# Patient Record
Sex: FEMALE | URBAN - METROPOLITAN AREA
[De-identification: names, ages, dates, MRNs, and addresses within clinical notes are randomized per-mention and may not be internally consistent; named-entity substitution may affect disease eponyms.]

---

## 2021-02-22 ENCOUNTER — HOSPITAL ENCOUNTER (EMERGENCY)
Facility: HOSPITAL | Age: 31
Discharge: LEFT BEFORE DISCHARGE | End: 2021-02-22

## 2021-02-22 ENCOUNTER — APPOINTMENT (OUTPATIENT)
Dept: GENERAL RADIOLOGY | Facility: HOSPITAL | Age: 31
End: 2021-02-22
Attending: NURSE PRACTITIONER
Payer: COMMERCIAL

## 2021-02-22 ENCOUNTER — HOSPITAL ENCOUNTER (EMERGENCY)
Facility: HOSPITAL | Age: 31
Discharge: HOME | End: 2021-02-22
Payer: COMMERCIAL

## 2021-02-22 VITALS
BODY MASS INDEX: 38.57 KG/M2 | DIASTOLIC BLOOD PRESSURE: 75 MMHG | HEIGHT: 66 IN | HEART RATE: 82 BPM | OXYGEN SATURATION: 97 % | WEIGHT: 240 LBS | SYSTOLIC BLOOD PRESSURE: 135 MMHG | TEMPERATURE: 99 F | RESPIRATION RATE: 16 BRPM

## 2021-02-22 DIAGNOSIS — R00.2 PALPITATIONS: Primary | ICD-10-CM

## 2021-02-22 DIAGNOSIS — R07.89 CHEST PAIN, ATYPICAL: ICD-10-CM

## 2021-02-22 LAB
ANION GAP SERPL CALC-SCNC: 4 MMOL/L (ref 0–18)
B-HCG UR QL: NEGATIVE
BACTERIA UR QL AUTO: NEGATIVE /HPF
BASOPHILS # BLD AUTO: 0.02 X10(3) UL (ref 0–0.2)
BASOPHILS NFR BLD AUTO: 0.2 %
BILIRUB UR QL: NEGATIVE
BUN BLD-MCNC: 9 MG/DL (ref 7–18)
BUN/CREAT SERPL: 9.7 (ref 10–20)
CALCIUM BLD-MCNC: 9.2 MG/DL (ref 8.5–10.1)
CHLORIDE SERPL-SCNC: 108 MMOL/L (ref 98–112)
CLARITY UR: CLEAR
CO2 SERPL-SCNC: 27 MMOL/L (ref 21–32)
COLOR UR: YELLOW
CREAT BLD-MCNC: 0.93 MG/DL
D DIMER PPP FEU-MCNC: 0.32 UG/ML FEU (ref ?–0.5)
DEPRECATED RDW RBC AUTO: 44.5 FL (ref 35.1–46.3)
EOSINOPHIL # BLD AUTO: 0.08 X10(3) UL (ref 0–0.7)
EOSINOPHIL NFR BLD AUTO: 0.8 %
ERYTHROCYTE [DISTWIDTH] IN BLOOD BY AUTOMATED COUNT: 14.6 % (ref 11–15)
GLUCOSE BLD-MCNC: 96 MG/DL (ref 70–99)
GLUCOSE UR-MCNC: NEGATIVE MG/DL
HCT VFR BLD AUTO: 38.8 %
HGB BLD-MCNC: 12.2 G/DL
HGB UR QL STRIP.AUTO: NEGATIVE
IMM GRANULOCYTES # BLD AUTO: 0.02 X10(3) UL (ref 0–1)
IMM GRANULOCYTES NFR BLD: 0.2 %
KETONES UR-MCNC: NEGATIVE MG/DL
LEUKOCYTE ESTERASE UR QL STRIP.AUTO: NEGATIVE
LYMPHOCYTES # BLD AUTO: 2.85 X10(3) UL (ref 1–4)
LYMPHOCYTES NFR BLD AUTO: 29.9 %
MCH RBC QN AUTO: 26.6 PG (ref 26–34)
MCHC RBC AUTO-ENTMCNC: 31.4 G/DL (ref 31–37)
MCV RBC AUTO: 84.7 FL
MONOCYTES # BLD AUTO: 0.51 X10(3) UL (ref 0.1–1)
MONOCYTES NFR BLD AUTO: 5.4 %
NEUTROPHILS # BLD AUTO: 6.05 X10 (3) UL (ref 1.5–7.7)
NEUTROPHILS # BLD AUTO: 6.05 X10(3) UL (ref 1.5–7.7)
NEUTROPHILS NFR BLD AUTO: 63.5 %
NITRITE UR QL STRIP.AUTO: NEGATIVE
OSMOLALITY SERPL CALC.SUM OF ELEC: 287 MOSM/KG (ref 275–295)
PH UR: 6 [PH] (ref 5–8)
PLATELET # BLD AUTO: 302 10(3)UL (ref 150–450)
POTASSIUM SERPL-SCNC: 4 MMOL/L (ref 3.5–5.1)
PROT UR-MCNC: NEGATIVE MG/DL
RBC # BLD AUTO: 4.58 X10(6)UL
RBC #/AREA URNS AUTO: 1 /HPF
SODIUM SERPL-SCNC: 139 MMOL/L (ref 136–145)
SP GR UR STRIP: 1.02 (ref 1–1.03)
TROPONIN I SERPL-MCNC: <0.045 NG/ML (ref ?–0.04)
UROBILINOGEN UR STRIP-ACNC: <2
WBC # BLD AUTO: 9.5 X10(3) UL (ref 4–11)
WBC #/AREA URNS AUTO: <1 /HPF

## 2021-02-22 PROCEDURE — 85379 FIBRIN DEGRADATION QUANT: CPT | Performed by: NURSE PRACTITIONER

## 2021-02-22 PROCEDURE — 99285 EMERGENCY DEPT VISIT HI MDM: CPT

## 2021-02-22 PROCEDURE — 93010 ELECTROCARDIOGRAM REPORT: CPT | Performed by: INTERNAL MEDICINE

## 2021-02-22 PROCEDURE — 84484 ASSAY OF TROPONIN QUANT: CPT | Performed by: NURSE PRACTITIONER

## 2021-02-22 PROCEDURE — 81003 URINALYSIS AUTO W/O SCOPE: CPT | Performed by: NURSE PRACTITIONER

## 2021-02-22 PROCEDURE — 36415 COLL VENOUS BLD VENIPUNCTURE: CPT

## 2021-02-22 PROCEDURE — 81025 URINE PREGNANCY TEST: CPT

## 2021-02-22 PROCEDURE — 93005 ELECTROCARDIOGRAM TRACING: CPT

## 2021-02-22 PROCEDURE — 71045 X-RAY EXAM CHEST 1 VIEW: CPT | Performed by: NURSE PRACTITIONER

## 2021-02-22 PROCEDURE — 80048 BASIC METABOLIC PNL TOTAL CA: CPT

## 2021-02-22 PROCEDURE — 85025 COMPLETE CBC W/AUTO DIFF WBC: CPT

## 2021-02-22 RX ORDER — ALBUTEROL SULFATE 90 UG/1
2 AEROSOL, METERED RESPIRATORY (INHALATION) EVERY 4 HOURS PRN
Qty: 1 INHALER | Refills: 0 | Status: SHIPPED | OUTPATIENT
Start: 2021-02-22 | End: 2021-03-24

## 2021-02-22 NOTE — ED INITIAL ASSESSMENT (HPI)
Palpations while at work.states her HR was up to 130. Was at [facility] and sent here. cxr done.

## 2021-02-23 NOTE — ED NOTES
Patient discharged home in no acute distress. A&Ox4, skin p/w/d, appears in no acute respiratory distress at this time. Ambulating with steady gait and verbalized understanding of d/c instructions.  Patient decided to forego covid testing prior to discharge

## 2021-02-23 NOTE — ED PROVIDER NOTES
Patient Seen in: [facility] AND [facility] Emergency Department      History   Patient presents with:  Arrythmia/Palpitations    Stated Complaint: palpitations, sob    HPI/Subjective:   HPI    40-year-old female, with history of palpitations, presents to the anna Exam  Vitals signs and nursing note reviewed. Constitutional:       General: She is not in acute distress. Appearance: Normal appearance. She is well-developed. She is not ill-appearing. HENT:      Head: Normocephalic.    Eyes:      Conjunctiva/scle LAVENDER   RAINBOW DRAW LIGHT GREEN   RAINBOW DRAW GOLD   CBC W/ DIFFERENTIAL     Rate, intervals and axes as noted on EKG Report.   Rate: 96  Rhythm: Sinus Rhythm  Reading: Sinus rhythm without ST elevation or depression, no WPW, long QT, Brugada or Wellen

## (undated) NOTE — LETTER
Date & Time: 2/22/2021, 8:38 PM  Patient: Scotty Pino  Encounter Provider(s):    ARJUN Davis       To Whom It May Concern:    Scotty Pino was seen and treated in our department on 2/22/2021.  She should not return to work until We

## (undated) NOTE — LETTER
Date & Time: 2/22/2021, 8:39 PM  Patient: Sandra Arana  Encounter Provider(s):    ARJUN Sandoval       To Whom It May Concern:    Sandra Arana was seen and treated in our department on 2/22/2021. She can return to work .     If you hav